# Patient Record
(demographics unavailable — no encounter records)

---

## 2024-11-14 NOTE — PHYSICAL EXAM
[Alert] : alert [No Acute Distress] : no acute distress [Normal Rate and Effort] : normal respiratory rhythm and effort [Normal Rate] : heart rate was normal  [Skin Intact] : skin intact  [Conjunctival Erythema] : no conjunctival erythema [Pale mucosa] : no pale mucosa [Boggy Nasal Turbinates] : no boggy and/or pale nasal turbinates [Pharyngeal erythema] : no pharyngeal erythema [Posterior Pharyngeal Cobblestoning] : no posterior pharyngeal cobblestoning [Clear Rhinorrhea] : no clear rhinorrhea was seen [Wheezing] : no wheezing was heard

## 2024-11-14 NOTE — REVIEW OF SYSTEMS
[Rhinorrhea] : rhinorrhea [Nasal Congestion] : nasal congestion [Post Nasal Drip] : post nasal drip [Difficulty Breathing] : dyspnea [Nocturnal Awakening] : nocturnal awakening with shortness of breath [Cough] : cough [Nl] : Integumentary [Sneezing] : no sneezing [Wheezing] : no wheezing

## 2024-11-14 NOTE — ASSESSMENT
[FreeTextEntry1] : 15 y/o with hx of mild-moderate persistent/exercise induced asthma and allergies - was doing well however for past 3 weeks has been c/o cough, sob, chest tightness with nasal congestion and runny nose.  Uses Singulair 5 mg QD, Symbicort 160 2p QD and albuterol 2x a day w/o any help.   Sx are probably due to URI   Suggest - Increase Symbicort 160 2 puffs BID for next few weeks and once better can go back to 2p QD - advised to rinse mouth after using.  - Continue Albuterol 2 puffs q4-6hrs x 3 days and once better use PRN - Increase to Singulair 10mg 1 tab po qhs - Start using Flonase 50mcg 2 sprays QD and use Antihistamine PRN  - Start using Augmentin 875/125 1 tab BID - pt advised to take it with food and pt denies any antibiotic allergy - Pt advised to get tested for COVID if symptoms persist. - Pt to call back if symptoms persist or worsens - can consider oral steroids - Will do PFTs next visit  F/u in 3 months or PRN  Total time spent 35 minutes on the date of the encounter. This included time devoted to preparing to see the patient with review of previous medical record, obtaining medical history, performing physical exam, counseling and patient education with patient and family, ordering medications and lab studies, documentation in the medical record and coordination of care. The time spent is separate from time spent on separately billed procedures.

## 2024-11-14 NOTE — HISTORY OF PRESENT ILLNESS
[Eczematous rashes] : eczematous rashes [Food Allergies] : food allergies [de-identified] : 17 y/o here with sister last seen 09/05/2023 with hx of mild to moderate persistent/exercise induced asthma and AR - uses Singulair 5 mg QD, Symbicort 160mcg 2p QD and albuterol as needed. Symbicort is usually required in fall, winter and spring season and she is off of her ICS/LABA in summer.   Pt returns today 11/14/2024 - pt was doing well on meds however for past 2-3 weeks she is c/o productive cough, sob, chest tightness, nasal congestion and runny nose. Her symptoms started with fever which resolved after 2 days. Her cough does get worse in nighttime.  Pt was seen by peds 2 weeks ago and CXR was done which was normal per patient and her sister.  Pt is using Singulair 5mg QD, Symbicort 2p QD, albuterol 2x a day and has tried cough med OTC w/o any help. She states her symptoms are not getting better at all.  Her asthma does flares with URIs.   PFT 11/29/22 and 09/05/2024 - normal  AR - pt denies using any meds and has been doing well.

## 2024-11-14 NOTE — REASON FOR VISIT
[Routine Follow-Up] : a routine follow-up visit for [Cough] : cough [Shortness of Breath] : shortness of breath [Family Member] : family member

## 2024-11-14 NOTE — SOCIAL HISTORY
[House] : [unfilled] lives in a house  [Radiator/Baseboard] : heating provided by radiator(s)/baseboard(s) [Central] : air conditioning provided by central unit [Dust Mite Covers] : has dust mite covers [Dog] : dog [Feather Pillows] : does not have feather pillows [Bedroom] : not in the bedroom [Living Area] : not in the living area [Smokers in Household] : there are no smokers in the home [de-identified] : area rugs in basement and living room